# Patient Record
Sex: FEMALE | Race: WHITE | NOT HISPANIC OR LATINO | Employment: STUDENT | URBAN - METROPOLITAN AREA
[De-identification: names, ages, dates, MRNs, and addresses within clinical notes are randomized per-mention and may not be internally consistent; named-entity substitution may affect disease eponyms.]

---
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
## 2023-11-12 NOTE — DISCHARGE INSTRUCTIONS
Take 1000 mg of Acetaminophen (Tylenol) with 600 mg of Ibuprofen (Advil) every 8 hours for 7 days.      complains of pain/discomfort